# Patient Record
Sex: MALE | Race: WHITE | NOT HISPANIC OR LATINO | Employment: FULL TIME | URBAN - METROPOLITAN AREA
[De-identification: names, ages, dates, MRNs, and addresses within clinical notes are randomized per-mention and may not be internally consistent; named-entity substitution may affect disease eponyms.]

---

## 2021-08-12 ENCOUNTER — OFFICE VISIT (OUTPATIENT)
Dept: PRIMARY CARE CLINIC | Facility: CLINIC | Age: 36
End: 2021-08-12
Payer: COMMERCIAL

## 2021-08-12 VITALS
HEIGHT: 70 IN | BODY MASS INDEX: 28.85 KG/M2 | RESPIRATION RATE: 16 BRPM | OXYGEN SATURATION: 98 % | HEART RATE: 98 BPM | SYSTOLIC BLOOD PRESSURE: 120 MMHG | DIASTOLIC BLOOD PRESSURE: 72 MMHG | WEIGHT: 201.5 LBS

## 2021-08-12 DIAGNOSIS — T50.901S ACCIDENTAL DRUG OVERDOSE, SEQUELA: ICD-10-CM

## 2021-08-12 DIAGNOSIS — F41.9 ANXIETY: ICD-10-CM

## 2021-08-12 DIAGNOSIS — Z09 HOSPITAL DISCHARGE FOLLOW-UP: Primary | ICD-10-CM

## 2021-08-12 PROCEDURE — 3074F SYST BP LT 130 MM HG: CPT | Mod: CPTII,S$GLB,, | Performed by: NURSE PRACTITIONER

## 2021-08-12 PROCEDURE — 1159F MED LIST DOCD IN RCRD: CPT | Mod: CPTII,S$GLB,, | Performed by: NURSE PRACTITIONER

## 2021-08-12 PROCEDURE — 3008F PR BODY MASS INDEX (BMI) DOCUMENTED: ICD-10-PCS | Mod: CPTII,S$GLB,, | Performed by: NURSE PRACTITIONER

## 2021-08-12 PROCEDURE — 99203 PR OFFICE/OUTPT VISIT, NEW, LEVL III, 30-44 MIN: ICD-10-PCS | Mod: S$GLB,,, | Performed by: NURSE PRACTITIONER

## 2021-08-12 PROCEDURE — 3008F BODY MASS INDEX DOCD: CPT | Mod: CPTII,S$GLB,, | Performed by: NURSE PRACTITIONER

## 2021-08-12 PROCEDURE — 1159F PR MEDICATION LIST DOCUMENTED IN MEDICAL RECORD: ICD-10-PCS | Mod: CPTII,S$GLB,, | Performed by: NURSE PRACTITIONER

## 2021-08-12 PROCEDURE — 1126F PR PAIN SEVERITY QUANTIFIED, NO PAIN PRESENT: ICD-10-PCS | Mod: CPTII,S$GLB,, | Performed by: NURSE PRACTITIONER

## 2021-08-12 PROCEDURE — 1160F PR REVIEW ALL MEDS BY PRESCRIBER/CLIN PHARMACIST DOCUMENTED: ICD-10-PCS | Mod: CPTII,S$GLB,, | Performed by: NURSE PRACTITIONER

## 2021-08-12 PROCEDURE — 1160F RVW MEDS BY RX/DR IN RCRD: CPT | Mod: CPTII,S$GLB,, | Performed by: NURSE PRACTITIONER

## 2021-08-12 PROCEDURE — 99999 PR PBB SHADOW E&M-NEW PATIENT-LVL III: CPT | Mod: PBBFAC,,, | Performed by: NURSE PRACTITIONER

## 2021-08-12 PROCEDURE — 1126F AMNT PAIN NOTED NONE PRSNT: CPT | Mod: CPTII,S$GLB,, | Performed by: NURSE PRACTITIONER

## 2021-08-12 PROCEDURE — 3078F PR MOST RECENT DIASTOLIC BLOOD PRESSURE < 80 MM HG: ICD-10-PCS | Mod: CPTII,S$GLB,, | Performed by: NURSE PRACTITIONER

## 2021-08-12 PROCEDURE — 99203 OFFICE O/P NEW LOW 30 MIN: CPT | Mod: S$GLB,,, | Performed by: NURSE PRACTITIONER

## 2021-08-12 PROCEDURE — 3074F PR MOST RECENT SYSTOLIC BLOOD PRESSURE < 130 MM HG: ICD-10-PCS | Mod: CPTII,S$GLB,, | Performed by: NURSE PRACTITIONER

## 2021-08-12 PROCEDURE — 99999 PR PBB SHADOW E&M-NEW PATIENT-LVL III: ICD-10-PCS | Mod: PBBFAC,,, | Performed by: NURSE PRACTITIONER

## 2021-08-12 PROCEDURE — 3078F DIAST BP <80 MM HG: CPT | Mod: CPTII,S$GLB,, | Performed by: NURSE PRACTITIONER

## 2021-08-14 ENCOUNTER — HOSPITAL ENCOUNTER (EMERGENCY)
Facility: HOSPITAL | Age: 36
Discharge: HOME | End: 2021-08-14
Attending: FAMILY MEDICINE
Payer: COMMERCIAL

## 2021-08-14 VITALS
TEMPERATURE: 97 F | HEART RATE: 90 BPM | BODY MASS INDEX: 28.7 KG/M2 | SYSTOLIC BLOOD PRESSURE: 111 MMHG | OXYGEN SATURATION: 96 % | RESPIRATION RATE: 18 BRPM | DIASTOLIC BLOOD PRESSURE: 69 MMHG | WEIGHT: 200 LBS

## 2021-08-14 DIAGNOSIS — F10.920 ACUTE ALCOHOLIC INTOXICATION WITHOUT COMPLICATION: Primary | ICD-10-CM

## 2021-08-14 PROCEDURE — 99282 EMERGENCY DEPT VISIT SF MDM: CPT

## 2021-08-18 ENCOUNTER — TELEPHONE (OUTPATIENT)
Dept: PRIMARY CARE CLINIC | Facility: CLINIC | Age: 36
End: 2021-08-18

## 2021-08-19 ENCOUNTER — TELEPHONE (OUTPATIENT)
Dept: PRIMARY CARE CLINIC | Facility: CLINIC | Age: 36
End: 2021-08-19

## 2021-08-20 ENCOUNTER — TELEPHONE (OUTPATIENT)
Dept: PRIMARY CARE CLINIC | Facility: CLINIC | Age: 36
End: 2021-08-20

## 2023-04-21 ENCOUNTER — OFFICE VISIT (OUTPATIENT)
Dept: URGENT CARE | Facility: CLINIC | Age: 38
End: 2023-04-21
Payer: COMMERCIAL

## 2023-04-21 VITALS
SYSTOLIC BLOOD PRESSURE: 116 MMHG | TEMPERATURE: 98 F | WEIGHT: 170 LBS | BODY MASS INDEX: 24.34 KG/M2 | HEART RATE: 66 BPM | HEIGHT: 70 IN | DIASTOLIC BLOOD PRESSURE: 81 MMHG | RESPIRATION RATE: 16 BRPM | OXYGEN SATURATION: 100 %

## 2023-04-21 DIAGNOSIS — G56.02 CARPAL TUNNEL SYNDROME OF LEFT WRIST: Primary | ICD-10-CM

## 2023-04-21 PROCEDURE — 99212 OFFICE O/P EST SF 10 MIN: CPT | Mod: S$GLB,,, | Performed by: NURSE PRACTITIONER

## 2023-04-21 PROCEDURE — 99212 PR OFFICE/OUTPT VISIT, EST, LEVL II, 10-19 MIN: ICD-10-PCS | Mod: S$GLB,,, | Performed by: NURSE PRACTITIONER

## 2023-04-21 RX ORDER — IBUPROFEN 800 MG/1
800 TABLET ORAL 3 TIMES DAILY
Qty: 90 TABLET | Refills: 0 | Status: SHIPPED | OUTPATIENT
Start: 2023-04-21 | End: 2023-05-21

## 2023-04-21 NOTE — PATIENT INSTRUCTIONS
Diagnosis and plan discussed with the patient, as well as the expected course and duration of his symptoms.  All questions and concerns were addressed prior to discharge.  He was advised referred for a PCP. Emergency department precautions were given.  Patient verbalized understanding and was happy with the plan of care.  Note dictated with voice recognition software, please excuse any grammatical areas.       You must understand that you've received an Urgent Care treatment only and that you may be released before all your medical problems are known or treated. You, the patient, will arrange for follow up care as instructed.  Follow up with your PCP or specialty clinic as directed in the next 1-2 weeks if not improved or as needed.  You can call [phone] to schedule an appointment with the appropriate provider.  If your condition worsens we recommend that you receive another evaluation at the emergency room immediately or contact your primary medical clinics after hours call service to discuss your concerns.  Please return here or go to the Emergency Department for any concerns or worsening of condition.    If you were prescribed a narcotic or controlled medication, do not drive or operate heavy equipment or machinery while taking these medications.    Thank you for choosing [facility]!    Our goal in the Urgent Care is to always provide outstanding medical care. You may receive a survey by mail or e-mail in the next week regarding your experience today. We would greatly appreciate you completing and returning the survey. Your feedback provides us with a way to recognize our staff who provide very good care, and it helps us learn how to improve when your experience was below our aspiration of excellence.      We appreciate you trusting us with your medical care. We hope you feel better soon. We will be happy to take care of you for all of your future medical needs.    Sincerely,    Bartolome Rodriguez  DNP, FNP

## 2023-04-21 NOTE — PROGRESS NOTES
"Subjective:      Patient ID: Lc Colbert is a 38 y.o. male.    Vitals:  height is 5' 10" (1.778 m) and weight is 77.1 kg (170 lb). His oral temperature is 98.2 °F (36.8 °C). His blood pressure is 116/81 and his pulse is 66. His respiration is 16 and oxygen saturation is 100%.     Chief Complaint: Numbness (Left 4th & 5th digit and medical side of forearm)    38-year-old male presents today with a complaint of numbness to his left 4th and 5th digits as well as the medical side of his left forearm, and left wrist.  Pt denies any pain or injury.  Patient has a occupation a .  He denies fever, chills, joint swelling.  He denies a past medical history of illness.    Other  This is a new problem. Episode onset: 2 weeks. The problem occurs constantly. The problem has been unchanged. Associated symptoms include numbness and weakness. Pertinent negatives include no chest pain, chills, coughing, fever, joint swelling, myalgias or rash. Nothing aggravates the symptoms. He has tried nothing for the symptoms.     Constitution: Negative for chills, fever and generalized weakness.   Cardiovascular:  Negative for chest pain, palpitations and sob on exertion.   Respiratory:  Negative for cough and shortness of breath.    Musculoskeletal:  Negative for pain, trauma, joint swelling, arthritis, gout and muscle ache.   Skin:  Negative for rash.   Neurological:  Positive for numbness and tingling. Negative for tremors.    Objective:     Physical Exam   Constitutional: He is oriented to person, place, and time. He appears well-developed. He is cooperative.  Non-toxic appearance. He does not appear ill.   HENT:   Head: Normocephalic and atraumatic.   Ears:   Right Ear: Hearing normal.   Left Ear: Hearing normal.   Nose: Nose normal. No mucosal edema or nasal deformity. No epistaxis. Right sinus exhibits no maxillary sinus tenderness and no frontal sinus tenderness. Left sinus exhibits no maxillary sinus tenderness and no " frontal sinus tenderness.   Mouth/Throat: Uvula is midline, oropharynx is clear and moist and mucous membranes are normal. No trismus in the jaw. Normal dentition. No uvula swelling.   Eyes: Conjunctivae and lids are normal.   Neck: Trachea normal and phonation normal. Neck supple.   Cardiovascular: Normal rate, regular rhythm, normal heart sounds and normal pulses.   Pulmonary/Chest: Effort normal and breath sounds normal.   Abdominal: Normal appearance and bowel sounds are normal. Soft.   Musculoskeletal: Normal range of motion.         General: Normal range of motion.      Right wrist: Normal.      Left wrist: He exhibits no bony tenderness, no swelling and no deformity.      Right hand: Motor /Testing: The patient has normal right wrist strength.      Left hand: Motor /Testing: : 5/5. Ring Finger: 4/5. Little Finger: 4/5. Testing: Phalen's sign at left wrist. no Tinel's sign at left wrist.   Neurological: He is alert and oriented to person, place, and time. He displays no weakness. No cranial nerve deficit. He exhibits normal muscle tone.   Skin: Skin is warm, dry and intact.   Psychiatric: His speech is normal and behavior is normal. Judgment and thought content normal.   Nursing note and vitals reviewed.    Assessment:     1. Carpal tunnel syndrome of left wrist        Plan:     Discussed PCP referral for follow-up visit secondary to further testing may be warranted secondary to current complaint.  Discussed differential, carpal tunnel syndrome verses paresthesia to left extremity.  Wear splints daily.  Take medication as prescribed.  Cool compresses to affected wrist as needed.    Carpal tunnel syndrome of left wrist  -     ibuprofen (ADVIL,MOTRIN) 800 MG tablet; Take 1 tablet (800 mg total) by mouth 3 (three) times daily.  Dispense: 90 tablet; Refill: 0  -     WRIST BRACE FOR HOME USE  -     Ambulatory referral/consult to Internal Medicine